# Patient Record
Sex: FEMALE | Race: BLACK OR AFRICAN AMERICAN | NOT HISPANIC OR LATINO | Employment: UNEMPLOYED | URBAN - METROPOLITAN AREA
[De-identification: names, ages, dates, MRNs, and addresses within clinical notes are randomized per-mention and may not be internally consistent; named-entity substitution may affect disease eponyms.]

---

## 2017-09-29 ENCOUNTER — HOSPITAL ENCOUNTER (EMERGENCY)
Facility: HOSPITAL | Age: 37
Discharge: HOME | End: 2017-09-29
Attending: EMERGENCY MEDICINE
Payer: COMMERCIAL

## 2017-09-29 VITALS
OXYGEN SATURATION: 99 % | WEIGHT: 165 LBS | HEART RATE: 92 BPM | TEMPERATURE: 100 F | BODY MASS INDEX: 28.17 KG/M2 | DIASTOLIC BLOOD PRESSURE: 72 MMHG | SYSTOLIC BLOOD PRESSURE: 109 MMHG | HEIGHT: 64 IN | RESPIRATION RATE: 16 BRPM

## 2017-09-29 DIAGNOSIS — S16.1XXA NECK STRAIN, INITIAL ENCOUNTER: ICD-10-CM
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
## 2017-09-29 LAB
B-HCG UR QL: NEGATIVE
CTP QC/QA: YES

## 2017-09-29 PROCEDURE — 81025 URINE PREGNANCY TEST: CPT | Performed by: EMERGENCY MEDICINE

## 2017-09-29 PROCEDURE — 99284 EMERGENCY DEPT VISIT MOD MDM: CPT | Mod: 25

## 2017-09-29 PROCEDURE — 99283 EMERGENCY DEPT VISIT LOW MDM: CPT | Mod: ,,, | Performed by: EMERGENCY MEDICINE

## 2017-09-29 RX ORDER — NAPROXEN 375 MG/1
375 TABLET ORAL 2 TIMES DAILY WITH MEALS
Qty: 14 TABLET | Refills: 0 | Status: SHIPPED | OUTPATIENT
Start: 2017-09-29

## 2017-09-29 RX ORDER — METHOCARBAMOL 500 MG/1
500 TABLET, FILM COATED ORAL 2 TIMES DAILY
Qty: 14 TABLET | Refills: 0 | Status: SHIPPED | OUTPATIENT
Start: 2017-09-29 | End: 2017-10-09
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
## 2020-04-25 ENCOUNTER — NURSE TRIAGE (OUTPATIENT)
Dept: ADMINISTRATIVE | Facility: CLINIC | Age: 40
End: 2020-04-25

## 2020-04-26 NOTE — TELEPHONE ENCOUNTER
The patient called in to ask if a obgyn would see her because she tested positive on a home pregnancy test. Patient's obgyn is not with [facility]. Patient was advised to contact obgyn during office hours to schedule a visit. Patient has no issues at this time. The patient was advised to call back with questions, concerns or worsening symptoms. The patient verbalized understanding.     Reason for Disposition   Health Information question, no triage required and triager able to answer question    Additional Information   Negative: Caller is angry or rude (e.g., hangs up, verbally abusive, yelling)   Negative: Caller hangs up   Negative: Caller has already spoken with the PCP and has no further questions.   Negative: Caller has already spoken with another triager and has no further questions.   Negative: Caller has already spoken with another triager or PCP AND has further questions AND triager able to answer questions.   Negative: [1] Caller is not with the adult (patient) AND [2] reporting urgent symptoms   Negative: Lab result questions   Negative: Medication questions   Negative: Caller can't be reached by phone   Negative: Caller has already spoken to PCP or another triager   Negative: RN needs further essential information from caller in order to complete triage   Negative: Requesting regular office appointment   Negative: [1] Caller requesting NON-URGENT health information AND [2] PCP's office is the best resource    Protocols used: INFORMATION ONLY CALL-A-, NO CONTACT OR DUPLICATE CONTACT CALL-A-

## 2023-11-14 ENCOUNTER — NURSE TRIAGE (OUTPATIENT)
Dept: ADMINISTRATIVE | Facility: CLINIC | Age: 43
End: 2023-11-14
Payer: COMMERCIAL

## 2023-11-14 NOTE — TELEPHONE ENCOUNTER
Pt reports was seen in an UC for a headache, given a ketorlac injection, but BP is still elevated and still has a headache. BP has been 157/47, 154/60. Pt advised to see a MD within 2 weeks per protocol via PCP office/UC/ED, Pt encouraged to call back with any worsening symptoms or questions. Pt verbalized understanding.    Reason for Disposition   [1] Systolic BP  >= 130 OR Diastolic >= 80 AND [2] not taking BP medications    Additional Information   Negative: Difficult to awaken or acting confused (e.g., disoriented, slurred speech)   Negative: SEVERE difficulty breathing (e.g., struggling for each breath, speaks in single words)   Negative: [1] Weakness of the face, arm or leg on one side of the body AND [2] new-onset   Negative: [1] Numbness (i.e., loss of sensation) of the face, arm or leg on one side of the body AND [2] new-onset   Negative: [1] Chest pain lasts > 5 minutes AND [2] history of heart disease (i.e., heart attack, bypass surgery, angina, angioplasty, CHF)   Negative: [1] Chest pain AND [2] took nitrogylcerin AND [3] pain was not relieved   Negative: Sounds like a life-threatening emergency to the triager   Negative: [1] Systolic BP  >= 160 OR Diastolic >= 100 AND [2] cardiac (e.g., breathing difficulty, chest pain) or neurologic symptoms (e.g., new-onset blurred or double vision, unsteady gait)   Negative: [1] Pregnant 20 or more weeks (or postpartum < 6 weeks) AND [2] new hand or face swelling   Negative: [1] Pregnant 20 or more weeks (or postpartum < 6 weeks) AND [2] Systolic BP >= 160 OR Diastolic >= 110   Negative: [1] Systolic BP  >= 200 OR Diastolic >= 120 AND [2] having NO cardiac or neurologic symptoms   Negative: [1] Pregnant 20 or more weeks (or postpartum < 6 weeks) AND [2] Systolic BP  >= 140 OR Diastolic >= 90   Negative: [1] Systolic BP  >= 180 OR Diastolic >= 110 AND [2] missed most recent dose of blood pressure medication   Negative: Systolic BP  >= 180 OR Diastolic >= 110    Negative: Ran out of BP medications   Negative: Systolic BP  >= 160 OR Diastolic >= 100   Negative: [1] Taking BP medications AND [2] feels is having side effects (e.g., impotence, cough, dizzy upon standing)   Negative: [1] Systolic BP  >= 130 OR Diastolic >= 80 AND [2] pregnant   Negative: [1] Systolic BP  >= 130 OR Diastolic >= 80 AND [2] taking BP medications    Protocols used: Blood Pressure - High-A-

## 2024-07-26 ENCOUNTER — HOSPITAL ENCOUNTER (EMERGENCY)
Facility: HOSPITAL | Age: 44
Discharge: HOME | End: 2024-07-26
Attending: STUDENT IN AN ORGANIZED HEALTH CARE EDUCATION/TRAINING PROGRAM

## 2024-07-26 VITALS
OXYGEN SATURATION: 98 % | WEIGHT: 182 LBS | HEIGHT: 63 IN | RESPIRATION RATE: 18 BRPM | SYSTOLIC BLOOD PRESSURE: 135 MMHG | BODY MASS INDEX: 32.25 KG/M2 | DIASTOLIC BLOOD PRESSURE: 83 MMHG | HEART RATE: 89 BPM | TEMPERATURE: 100 F

## 2024-07-26 DIAGNOSIS — U07.1 COVID-19: Primary | ICD-10-CM

## 2024-07-26 LAB
CTP QC/QA: YES
SARS-COV-2 RDRP RESP QL NAA+PROBE: POSITIVE

## 2024-07-26 PROCEDURE — 87635 SARS-COV-2 COVID-19 AMP PRB: CPT | Performed by: STUDENT IN AN ORGANIZED HEALTH CARE EDUCATION/TRAINING PROGRAM

## 2024-07-26 PROCEDURE — 99283 EMERGENCY DEPT VISIT LOW MDM: CPT

## 2024-07-26 PROCEDURE — 25000003 PHARM REV CODE 250: Performed by: STUDENT IN AN ORGANIZED HEALTH CARE EDUCATION/TRAINING PROGRAM

## 2024-07-26 RX ORDER — IBUPROFEN 600 MG/1
600 TABLET ORAL
Status: COMPLETED | OUTPATIENT
Start: 2024-07-26 | End: 2024-07-26

## 2024-07-26 RX ADMIN — IBUPROFEN 600 MG: 600 TABLET, FILM COATED ORAL at 05:07
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
## 2024-07-26 NOTE — Clinical Note
"Becki"Hortensia Broussard was seen and treated in our emergency department on 7/26/2024.  She may return to work on 07/30/2024.       If you have any questions or concerns, please don't hesitate to call.      Brandyn Vu MD"

## 2025-05-07 ENCOUNTER — HOSPITAL ENCOUNTER (EMERGENCY)
Facility: HOSPITAL | Age: 45
Discharge: HOME | End: 2025-05-08
Attending: EMERGENCY MEDICINE
Payer: MEDICAID

## 2025-05-07 DIAGNOSIS — R10.84 GENERALIZED ABDOMINAL PAIN: Primary | ICD-10-CM

## 2025-05-07 DIAGNOSIS — N83.202 LEFT OVARIAN CYST: ICD-10-CM

## 2025-05-07 LAB
ABSOLUTE EOSINOPHIL (OHS): 0.17 K/UL
ABSOLUTE MONOCYTE (OHS): 0.44 K/UL (ref 0.3–1)
ABSOLUTE NEUTROPHIL COUNT (OHS): 1.98 K/UL (ref 1.8–7.7)
BACTERIA #/AREA URNS AUTO: ABNORMAL /HPF
BASOPHILS # BLD AUTO: 0.01 K/UL
BASOPHILS NFR BLD AUTO: 0.3 %
BILIRUB UR QL STRIP.AUTO: NEGATIVE
CLARITY UR: CLEAR
COLOR UR AUTO: YELLOW
ERYTHROCYTE [DISTWIDTH] IN BLOOD BY AUTOMATED COUNT: 14.6 % (ref 11.5–14.5)
GLUCOSE UR QL STRIP: NEGATIVE
HCT VFR BLD AUTO: 36.8 % (ref 37–48.5)
HGB BLD-MCNC: 11.6 GM/DL (ref 12–16)
HGB UR QL STRIP: ABNORMAL
IMM GRANULOCYTES # BLD AUTO: 0.01 K/UL (ref 0–0.04)
IMM GRANULOCYTES NFR BLD AUTO: 0.3 % (ref 0–0.5)
KETONES UR QL STRIP: NEGATIVE
LEUKOCYTE ESTERASE UR QL STRIP: ABNORMAL
LYMPHOCYTES # BLD AUTO: 1.24 K/UL (ref 1–4.8)
MCH RBC QN AUTO: 26.9 PG (ref 27–31)
MCHC RBC AUTO-ENTMCNC: 31.5 G/DL (ref 32–36)
MCV RBC AUTO: 85 FL (ref 82–98)
MICROSCOPIC COMMENT: ABNORMAL
NITRITE UR QL STRIP: NEGATIVE
NUCLEATED RBC (/100WBC) (OHS): 0 /100 WBC
PH UR STRIP: 7 [PH]
PLATELET # BLD AUTO: 265 K/UL (ref 150–450)
PMV BLD AUTO: 11.7 FL (ref 9.2–12.9)
PROT UR QL STRIP: NEGATIVE
RBC # BLD AUTO: 4.31 M/UL (ref 4–5.4)
RBC #/AREA URNS AUTO: 9 /HPF (ref 0–4)
RELATIVE EOSINOPHIL (OHS): 4.4 %
RELATIVE LYMPHOCYTE (OHS): 32.2 % (ref 18–48)
RELATIVE MONOCYTE (OHS): 11.4 % (ref 4–15)
RELATIVE NEUTROPHIL (OHS): 51.4 % (ref 38–73)
SP GR UR STRIP: >=1.03
SQUAMOUS #/AREA URNS AUTO: 2 /HPF
UROBILINOGEN UR STRIP-ACNC: ABNORMAL EU/DL
WBC # BLD AUTO: 3.85 K/UL (ref 3.9–12.7)
WBC #/AREA URNS AUTO: 1 /HPF (ref 0–5)

## 2025-05-07 PROCEDURE — 81003 URINALYSIS AUTO W/O SCOPE: CPT | Performed by: EMERGENCY MEDICINE

## 2025-05-07 PROCEDURE — 83690 ASSAY OF LIPASE: CPT | Performed by: EMERGENCY MEDICINE

## 2025-05-07 PROCEDURE — 85025 COMPLETE CBC W/AUTO DIFF WBC: CPT | Performed by: EMERGENCY MEDICINE

## 2025-05-07 PROCEDURE — 99285 EMERGENCY DEPT VISIT HI MDM: CPT | Mod: 25

## 2025-05-07 PROCEDURE — 80053 COMPREHEN METABOLIC PANEL: CPT | Performed by: EMERGENCY MEDICINE

## 2025-05-07 PROCEDURE — 63600175 PHARM REV CODE 636 W HCPCS: Performed by: EMERGENCY MEDICINE

## 2025-05-07 PROCEDURE — 96375 TX/PRO/DX INJ NEW DRUG ADDON: CPT

## 2025-05-07 PROCEDURE — 96374 THER/PROPH/DIAG INJ IV PUSH: CPT

## 2025-05-07 RX ORDER — KETOROLAC TROMETHAMINE 30 MG/ML
15 INJECTION, SOLUTION INTRAMUSCULAR; INTRAVENOUS
Status: COMPLETED | OUTPATIENT
Start: 2025-05-07 | End: 2025-05-07

## 2025-05-07 RX ORDER — ONDANSETRON HYDROCHLORIDE 2 MG/ML
4 INJECTION, SOLUTION INTRAVENOUS
Status: COMPLETED | OUTPATIENT
Start: 2025-05-07 | End: 2025-05-07

## 2025-05-07 RX ADMIN — ONDANSETRON 4 MG: 2 INJECTION INTRAMUSCULAR; INTRAVENOUS at 10:05

## 2025-05-07 RX ADMIN — KETOROLAC TROMETHAMINE 15 MG: 30 INJECTION, SOLUTION INTRAMUSCULAR; INTRAVENOUS at 10:05

## 2025-05-07 NOTE — Clinical Note
"Becki Pachecodavide Broussard was seen and treated in our emergency department on 5/7/2025.  She may return to work on 05/09/2025.       If you have any questions or concerns, please don't hesitate to call.      Angie Matson, DO"

## 2025-05-08 VITALS
BODY MASS INDEX: 33.31 KG/M2 | RESPIRATION RATE: 19 BRPM | HEIGHT: 63 IN | DIASTOLIC BLOOD PRESSURE: 74 MMHG | TEMPERATURE: 98 F | WEIGHT: 188 LBS | SYSTOLIC BLOOD PRESSURE: 116 MMHG | OXYGEN SATURATION: 100 % | HEART RATE: 80 BPM

## 2025-05-08 LAB
ALBUMIN SERPL BCP-MCNC: 3.7 G/DL (ref 3.5–5.2)
ALP SERPL-CCNC: 74 UNIT/L (ref 40–150)
ALT SERPL W/O P-5'-P-CCNC: 18 UNIT/L (ref 10–44)
ANION GAP (OHS): 9 MMOL/L (ref 8–16)
AST SERPL-CCNC: 19 UNIT/L (ref 11–45)
BILIRUB SERPL-MCNC: 0.3 MG/DL (ref 0.1–1)
BUN SERPL-MCNC: 15 MG/DL (ref 6–20)
CALCIUM SERPL-MCNC: 9.3 MG/DL (ref 8.7–10.5)
CHLORIDE SERPL-SCNC: 107 MMOL/L (ref 95–110)
CO2 SERPL-SCNC: 20 MMOL/L (ref 23–29)
CREAT SERPL-MCNC: 0.6 MG/DL (ref 0.5–1.4)
CREAT SERPL-MCNC: 0.7 MG/DL (ref 0.5–1.4)
GFR SERPLBLD CREATININE-BSD FMLA CKD-EPI: >60 ML/MIN/1.73/M2
GLUCOSE SERPL-MCNC: 80 MG/DL (ref 70–110)
HOLD SPECIMEN: NORMAL
LIPASE SERPL-CCNC: 19 U/L (ref 4–60)
POTASSIUM SERPL-SCNC: 3.9 MMOL/L (ref 3.5–5.1)
PROT SERPL-MCNC: 8 GM/DL (ref 6–8.4)
SAMPLE: NORMAL
SODIUM SERPL-SCNC: 136 MMOL/L (ref 136–145)

## 2025-05-08 PROCEDURE — 82565 ASSAY OF CREATININE: CPT

## 2025-05-08 PROCEDURE — 25500020 PHARM REV CODE 255: Performed by: EMERGENCY MEDICINE

## 2025-05-08 RX ORDER — NAPROXEN 375 MG/1
375 TABLET ORAL 2 TIMES DAILY WITH MEALS
Qty: 14 TABLET | Refills: 0 | Status: SHIPPED | OUTPATIENT
Start: 2025-05-08

## 2025-05-08 RX ADMIN — IOHEXOL 100 ML: 350 INJECTION, SOLUTION INTRAVENOUS at 12:05

## 2025-05-08 NOTE — ED PROVIDER NOTES
Encounter Date: 5/7/2025       History     Chief Complaint   Patient presents with    Abdominal Pain     Lower right abdominal pain since 10am today; states hx fibroid in same area; states sensation feels the same. Denies N/V/D.     The patient is a 45-year-old female who came to the emergency department with abdominal pain that started at 10:00 a.m. this morning.  She states her last p.o. intake was this morning her last bowel movement was at 2:00 p.m. today.  She is status post a hysterectomy 1 year ago and states she has been having intermittent pain since her surgery.  She denies any nausea vomiting or diarrhea, no fever, no burning frequency or dysuria.  The pain is in the right upper quadrant and the right lower quadrant.      Review of patient's allergies indicates:  No Known Allergies  History reviewed. No pertinent past medical history.  Past Surgical History:   Procedure Laterality Date    CHOLECYSTECTOMY       Family History   Problem Relation Name Age of Onset    Hypertension Maternal Grandmother      Diabetes Maternal Grandmother      Cataracts Maternal Grandmother      Cancer Maternal Grandmother      Amblyopia Neg Hx      Blindness Neg Hx      Glaucoma Neg Hx      Macular degeneration Neg Hx      Retinal detachment Neg Hx      Strabismus Neg Hx      Stroke Neg Hx      Thyroid disease Neg Hx       Social History[1]  Review of Systems   All other systems reviewed and are negative.      Physical Exam     Initial Vitals [05/07/25 2046]   BP Pulse Resp Temp SpO2   137/74 98 19 99 °F (37.2 °C) 100 %      MAP       --         Physical Exam    Nursing note and vitals reviewed.  Constitutional: She appears well-developed and well-nourished.   HENT:   Head: Normocephalic and atraumatic.   Neck: Neck supple.   Normal range of motion.  Pulmonary/Chest: Breath sounds normal.   Abdominal: Abdomen is soft. There is abdominal tenderness (Mild tenderness to the right upper quadrant and right lower quadrant). There is  no rebound and no guarding.   Musculoskeletal:      Cervical back: Normal range of motion and neck supple.     Neurological: She is alert and oriented to person, place, and time.   Skin: Skin is warm and dry.   Psychiatric: She has a normal mood and affect. Her behavior is normal. Judgment and thought content normal.         ED Course   Procedures  Labs Reviewed   COMPREHENSIVE METABOLIC PANEL - Abnormal       Result Value    Sodium 136      Potassium 3.9      Chloride 107      CO2 20 (*)     Glucose 80      BUN 15      Creatinine 0.7      Calcium 9.3      Protein Total 8.0      Albumin 3.7      Bilirubin Total 0.3      ALP 74      AST 19      ALT 18      Anion Gap 9      eGFR >60     URINALYSIS, REFLEX TO URINE CULTURE - Abnormal    Color, UA Yellow      Appearance, UA Clear      pH, UA 7.0      Spec Grav UA >=1.030 (*)     Protein, UA Negative      Glucose, UA Negative      Ketones, UA Negative      Bilirubin, UA Negative      Blood, UA Trace (*)     Nitrites, UA Negative      Urobilinogen, UA 2.0-3.0 (*)     Leukocyte Esterase, UA Trace (*)    CBC WITH DIFFERENTIAL - Abnormal    WBC 3.85 (*)     RBC 4.31      HGB 11.6 (*)     HCT 36.8 (*)     MCV 85      MCH 26.9 (*)     MCHC 31.5 (*)     RDW 14.6 (*)     Platelet Count 265      MPV 11.7      Nucleated RBC 0      Neut % 51.4      Lymph % 32.2      Mono % 11.4      Eos % 4.4      Basophil % 0.3      Imm Grans % 0.3      Neut # 1.98      Lymph # 1.24      Mono # 0.44      Eos # 0.17      Baso # 0.01      Imm Grans # 0.01     URINALYSIS MICROSCOPIC - Abnormal    RBC, UA 9 (*)     WBC, UA 1      Bacteria, UA Rare      Squamous Epithelial Cells, UA 2      Microscopic Comment       LIPASE - Normal    Lipase Level 19     CBC W/ AUTO DIFFERENTIAL    Narrative:     The following orders were created for panel order CBC W/ AUTO DIFFERENTIAL.  Procedure                               Abnormality         Status                     ---------                                -----------         ------                     CBC with Differential[[id number]]       Abnormal            Final result                 Please view results for these tests on the individual orders.   GREY TOP URINE HOLD    Extra Tube Hold for add-ons.     ISTAT CREATININE    POC Creatinine 0.6      Sample VENOUS            Imaging Results              CT Abdomen Pelvis With IV Contrast NO Oral Contrast (Final result)  Result time 05/08/25 01:14:19      Final result by Christen Balderas MD (05/08/25 01:14:19)                   Impression:      No evidence of significant acute abnormality in the abdomen pelvis noting trace amount of fluid in the pelvis may be physiologic.  There is a left adnexal/ovarian cyst measuring 1.9 cm.    Nonobstructing nephrolithiasis in the left kidney.    Appendix is not conspicuous noting no evidence of acute appendicitis.  No acute abnormality involving the bowel.    Cholecystectomy.      Electronically signed by: Christen Balderas  Date:    05/08/2025  Time:    01:14               Narrative:    EXAMINATION:  CT ABDOMEN PELVIS WITH IV CONTRAST    CLINICAL HISTORY:  RLQ abdominal pain (Age >= 14y);    TECHNIQUE:  Low dose axial images, sagittal and coronal reformations were obtained from the lung bases to the pubic symphysis following the IV administration of 100 mL of Omnipaque 350 .  Oral contrast was not given.    COMPARISON:  05/07/2025 limited abdominal ultrasound.    FINDINGS:  Lung bases appear clear.  There is no evidence of pleural or pericardial effusion.  Imaged heart is not enlarged.    Liver is homogeneous and not significantly enlarged.  The patient is post cholecystectomy.  There is no intra or extrahepatic biliary duct dilatation.    Pancreas is unremarkable.  Spleen is homogeneous and nonenlarged.  Adrenal glands are normal.    Renal cortices enhance symmetrically and homogeneously.  There is a 3 mm nonobstructing calculus in the lower pole of the left kidney.  There is no  hydronephrosis or ureterectasis.    Stomach, small bowel and colon are unremarkable for an acute process.  There is no evidence of mesenteric inflammation.  There are shotty mesenteric lymph nodes which are nonspecific.    The aorta and IVC are unremarkable.  There is no retroperitoneal adenopathy.    Bladder is unremarkable.  There is a left adnexal cyst measuring 1.9 cm in maximal dimension.  Uterus is surgically absent.  Trace nonspecific free fluid in the pelvis may be physiologic.    There is no focal acute or suspicious osseous lesion.                                       US Abdomen Limited (Final result)  Result time 05/07/25 23:44:15      Final result by Christen Balderas MD (05/07/25 23:44:15)                   Impression:      No significant acute or focal sonographic abnormality on this limited right upper quadrant exam.    Cholecystectomy.      Electronically signed by: Christen Balderas  Date:    05/07/2025  Time:    23:44               Narrative:    EXAMINATION:  US ABDOMEN LIMITED    CLINICAL HISTORY:  Abdominal Pain - Gallbladder;    TECHNIQUE:  Limited ultrasound of the right upper quadrant of the abdomen (including pancreas, liver, gallbladder, common bile duct, and spleen) was performed.    COMPARISON:  None.    FINDINGS:  Pancreas head and body are unremarkable noting the tail is obscured by bowel gas.    Liver: The liver is normal in size, measuring 12 cm. There is normal liver echogenicity and there are no focal hepatic lesions.    Gallbladder: Patient is post cholecystectomy    Biliary system: The common duct is not dilated, measuring 5.4 mm.  There is no intrahepatic biliary ductal dilatation.    Limited imaging of the right kidney demonstrates no hydronephrosis or significant findings.  Right kidney measures 9.3 cm.    Miscellaneous: No upper abdominal ascites.                                       Medications   ketorolac injection 15 mg (15 mg Intravenous Given 5/7/25 2230)   ondansetron  injection 4 mg (4 mg Intravenous Given 5/7/25 2230)   iohexoL (OMNIPAQUE 350) injection 100 mL (100 mLs Intravenous Given 5/8/25 0016)     Medical Decision Making  Differential Diagnosis includes, but is not limited to:  AAA, aortic dissection, mesenteric ischemia, perforated viscous, MI/ACS, SBO/volvulus, incarcerated/strangulated hernia, intussusception, ileus, appendicitis, cholecystitis, cholangitis, diverticulitis, esophagitis, hepatitis, nephrolithiasis, pancreatitis, gastroenteritis, colitis, IBD/IBS, biliary colic, GERD, PUD, constipation, UTI/pyelonephritis,  disorder.     MDM: The patient is a 45-year-old female with right upper quadrant and light lower quadrant pain.  I will check a gallbladder ultrasound and a CT scan of the abdomen pelvis along with labs.  She will be given Toradol and Zofran.    Amount and/or Complexity of Data Reviewed  Labs: ordered.  Radiology: ordered. Decision-making details documented in ED Course.    Risk  Prescription drug management.               ED Course as of 05/10/25 1738   Thu May 08, 2025   0004 US Abdomen Limited     Impression:     No significant acute or focal sonographic abnormality on this limited right upper quadrant exam.     Cholecystectomy.   [ST]   0118 CT Abdomen Pelvis With IV Contrast NO Oral Contrast  No evidence of significant acute abnormality in the abdomen pelvis noting trace amount of fluid in the pelvis may be physiologic.  There is a left adnexal/ovarian cyst measuring 1.9 cm.     Nonobstructing nephrolithiasis in the left kidney.     Appendix is not conspicuous noting no evidence of acute appendicitis.  No acute abnormality involving the bowel.     Cholecystectomy.      [HL]   0200 On reassessment, patient notes pain has significantly in prove.  Discussed finding of left ovarian cyst.  Has not had follow up with gynecology since hysterectomy about a year ago and encouraged close follow up from this standpoint.  Notes pain is well controlled at  this time.  Reasons to return discussed and all questions addressed [HL]      ED Course User Index  [HL] Angie Matson DO  [ST] Oxana Dejesus MD                           Clinical Impression:  Final diagnoses:  [R10.84] Generalized abdominal pain (Primary)  [N83.202] Left ovarian cyst          ED Disposition Condition    Discharge Stable          ED Prescriptions       Medication Sig Dispense Start Date End Date Auth. Provider    naproxen (NAPROSYN) 375 MG tablet Take 1 tablet (375 mg total) by mouth 2 (two) times daily with meals. 14 tablet 5/8/2025 -- Angie Matson DO          Follow-up Information       Follow up With Specialties Details Why Contact Info    Your Primary Care Physician  Schedule an appointment as soon as possible for a visit       [facility] Emergency Dept Emergency Medicine  As needed, If symptoms worsen [address] [facility] [id number] [phone]               [1]   Social History  Tobacco Use    Smoking status: Never    Smokeless tobacco: Never   Substance Use Topics    Alcohol use: No    Drug use: No        Oxana Dejesus MD  05/10/25 2866